# Patient Record
Sex: FEMALE | Race: WHITE | NOT HISPANIC OR LATINO | Employment: FULL TIME | URBAN - METROPOLITAN AREA
[De-identification: names, ages, dates, MRNs, and addresses within clinical notes are randomized per-mention and may not be internally consistent; named-entity substitution may affect disease eponyms.]

---

## 2024-01-08 NOTE — PROGRESS NOTES
"Subjective   Patient ID: Fanta Crow is a 38 y.o. female who presents for the evaluation and further management of metabolic syndrome.  HPI  She complains of the following:  Difficulty losing weight  Triglycerides being elevated   Joint pain  Craving for sugar and carbs  Lack of satiety   Increased swelling - face, hands and feet   Rashes - face, sides of the arms; resolves in 1-2 hours   Vision goes blurry after eating; persists for a few minutes  Increased thirst   Fatigued; increased coffee intake   Denies difficulty sleeping - 7-8 hours per night     She hand success with a keto diet with intermittent fasting.  She was able to lose 25-30 pounds  Regained when she came off keto diet  Tried Whole 30, elimination diets and the 80/20 method     The above symptoms began in 2015  Her weight back then was 130-140 lbs  Gained 20-30 pounds without any changes   Saw OB as menses were being affected.  She was changing tampon every 30 minutes, clots and dysmenorrhea   Shew as prescribed OCP but this caused rage and depression, increased bleeding  Temporary hair loss    She can no longer do treadmill due to knee and ankle pain     She has had Increased ovarian pain and was prescribed Sprintec  - she had problems with her mood, increased breast tenderness and size   She was prescribed a progesterone only pill which she has not yet started.    She denies HTN and DM     Legs become itchy with walking her dogs and or treadmil     Alcohol use - twice per month     Headaches - admits; posterior in location   Acne - cystic in childhood; now to neck     Fungal infections below breast line     Body odor has changed  - smells like onions     Urine smells sweet for the last few years      Review of Systems   Constitutional:  Positive for unexpected weight change.       Objective   Visit Vitals  /66 (BP Location: Right arm, Patient Position: Sitting, BP Cuff Size: Adult)   Pulse 86   Ht 1.549 m (5' 1\")   Wt 103 kg (226 lb " 6.4 oz)   BMI 42.78 kg/m²   Smoking Status Former   BSA 2.11 m²       Physical Exam  Vitals and nursing note reviewed.   Constitutional:       General: She is not in acute distress.     Appearance: Normal appearance. She is obese.      Comments: No buffalo hump    HENT:      Head: Normocephalic and atraumatic.      Nose: Nose normal.      Mouth/Throat:      Mouth: Mucous membranes are moist.   Eyes:      Extraocular Movements: Extraocular movements intact.   Cardiovascular:      Rate and Rhythm: Normal rate and regular rhythm.   Pulmonary:      Effort: Pulmonary effort is normal.      Breath sounds: Normal breath sounds.   Musculoskeletal:         General: Normal range of motion.   Skin:     General: Skin is warm.      Comments: No broad purple striae   Neurological:      Mental Status: She is alert and oriented to person, place, and time.   Psychiatric:         Mood and Affect: Mood normal.       Lab Results   Component Value Date    HGBA1C 5.3 01/11/2024     Lab Results   Component Value Date    GLUCOSE 92 01/11/2024    CALCIUM 9.5 01/11/2024     (L) 01/11/2024    K 4.1 01/11/2024    CO2 25 01/11/2024    CL 99 01/11/2024    BUN 10 01/11/2024    CREATININE 0.83 01/11/2024     Lab Results   Component Value Date    CHOL 182 01/11/2024     Lab Results   Component Value Date    HDL 38.4 01/11/2024     Lab Results   Component Value Date    LDLCALC 91 01/11/2024     Lab Results   Component Value Date    TRIG 262 (H) 01/11/2024       Assessment/Plan   38 year old female presents for the evaluation and further management of metabolic syndrome.    Weight gain  To obtain 8AM blood tests   For 24 hour urinary collection       Screening for thyroid disorder  To obtain TSH with reflex FT4 values     Headache  To obtain pituitary panel     Hypertriglyceridemia  To obtain lipid panel     Joint pain  To obtain LORENZO with reflex DEYA and Vitamin D levels     Fatigue  To obtain iron panel     Screening for diabetes mellitus  To  obtain A1C value     Will follow up with results     Itching  To obtain histamine levels

## 2024-01-09 ENCOUNTER — OFFICE VISIT (OUTPATIENT)
Dept: ENDOCRINOLOGY | Facility: CLINIC | Age: 39
End: 2024-01-09
Payer: COMMERCIAL

## 2024-01-09 VITALS
HEART RATE: 86 BPM | SYSTOLIC BLOOD PRESSURE: 114 MMHG | HEIGHT: 61 IN | WEIGHT: 226.4 LBS | DIASTOLIC BLOOD PRESSURE: 66 MMHG | BODY MASS INDEX: 42.74 KG/M2

## 2024-01-09 DIAGNOSIS — M25.50 ARTHRALGIA, UNSPECIFIED JOINT: ICD-10-CM

## 2024-01-09 DIAGNOSIS — L29.9 ITCHING: ICD-10-CM

## 2024-01-09 DIAGNOSIS — R63.5 WEIGHT GAIN: Primary | ICD-10-CM

## 2024-01-09 DIAGNOSIS — E78.2 ELEVATED CHOLESTEROL WITH ELEVATED TRIGLYCERIDES: ICD-10-CM

## 2024-01-09 DIAGNOSIS — E88.810 METABOLIC SYNDROME: ICD-10-CM

## 2024-01-09 DIAGNOSIS — R53.83 OTHER FATIGUE: ICD-10-CM

## 2024-01-09 PROCEDURE — 99205 OFFICE O/P NEW HI 60 MIN: CPT | Performed by: INTERNAL MEDICINE

## 2024-01-09 PROCEDURE — 1036F TOBACCO NON-USER: CPT | Performed by: INTERNAL MEDICINE

## 2024-01-09 RX ORDER — CETIRIZINE HYDROCHLORIDE 10 MG/1
10 TABLET ORAL
COMMUNITY

## 2024-01-09 RX ORDER — TIZANIDINE 2 MG/1
1-2 TABLET ORAL EVERY 6 HOURS PRN
COMMUNITY
Start: 2022-05-11

## 2024-01-09 RX ORDER — FLUTICASONE PROPIONATE 50 MCG
2 SPRAY, SUSPENSION (ML) NASAL
COMMUNITY
Start: 2023-11-10 | End: 2024-11-09

## 2024-01-09 RX ORDER — VALACYCLOVIR HYDROCHLORIDE 500 MG/1
500 TABLET, FILM COATED ORAL DAILY PRN
COMMUNITY
Start: 2023-02-20

## 2024-01-09 ASSESSMENT — ENCOUNTER SYMPTOMS: UNEXPECTED WEIGHT CHANGE: 1

## 2024-01-09 NOTE — PATIENT INSTRUCTIONS
Thank you for choosing [facility]  for your health care needs.  If you have any questions, concerns or medical needs, please feel free to contact our office at [phone].    Please ensure you complete your blood work one week before the next scheduled appointment.    To obtain 8AM blood tests   For 24 hour urinary collection   Will follow up with results

## 2024-01-10 ENCOUNTER — LAB (OUTPATIENT)
Dept: LAB | Facility: LAB | Age: 39
End: 2024-01-10
Payer: COMMERCIAL

## 2024-01-10 DIAGNOSIS — L29.9 ITCHING: ICD-10-CM

## 2024-01-10 DIAGNOSIS — R63.5 WEIGHT GAIN: ICD-10-CM

## 2024-01-10 DIAGNOSIS — E78.2 ELEVATED CHOLESTEROL WITH ELEVATED TRIGLYCERIDES: ICD-10-CM

## 2024-01-10 DIAGNOSIS — R53.83 OTHER FATIGUE: ICD-10-CM

## 2024-01-10 DIAGNOSIS — M25.50 ARTHRALGIA, UNSPECIFIED JOINT: ICD-10-CM

## 2024-01-10 DIAGNOSIS — E88.810 METABOLIC SYNDROME: ICD-10-CM

## 2024-01-11 ENCOUNTER — LAB (OUTPATIENT)
Dept: LAB | Facility: LAB | Age: 39
End: 2024-01-11
Payer: COMMERCIAL

## 2024-01-11 DIAGNOSIS — L29.9 ITCHING: ICD-10-CM

## 2024-01-11 DIAGNOSIS — R63.5 WEIGHT GAIN: ICD-10-CM

## 2024-01-11 LAB
25(OH)D3 SERPL-MCNC: 26 NG/ML (ref 30–100)
ANION GAP SERPL CALC-SCNC: 14 MMOL/L (ref 10–20)
BUN SERPL-MCNC: 10 MG/DL (ref 6–23)
CALCIUM SERPL-MCNC: 9.5 MG/DL (ref 8.6–10.3)
CHLORIDE SERPL-SCNC: 99 MMOL/L (ref 98–107)
CHOLEST SERPL-MCNC: 182 MG/DL (ref 0–199)
CHOLESTEROL/HDL RATIO: 4.7
CO2 SERPL-SCNC: 25 MMOL/L (ref 21–32)
COLLECT DURATION TIME SPEC: 24 HRS
CORTIS SERPL-MCNC: 14.6 UG/DL (ref 2.5–20)
CREAT 24H UR-MCNC: 54.2 MG/DL (ref 20–320)
CREAT 24H UR-MRATE: 1.63 G/24 H (ref 0.67–1.59)
CREAT SERPL-MCNC: 0.83 MG/DL (ref 0.5–1.05)
DHEA-S SERPL-MCNC: 129 UG/DL (ref 12–379)
EGFRCR SERPLBLD CKD-EPI 2021: >90 ML/MIN/1.73M*2
EST. AVERAGE GLUCOSE BLD GHB EST-MCNC: 105 MG/DL
FSH SERPL-ACNC: 6.3 IU/L
GLUCOSE SERPL-MCNC: 92 MG/DL (ref 74–99)
HBA1C MFR BLD: 5.3 %
HDLC SERPL-MCNC: 38.4 MG/DL
INSULIN P FAST SERPL-ACNC: 14 UIU/ML (ref 3–25)
IRON SATN MFR SERPL: 21 % (ref 25–45)
IRON SERPL-MCNC: 79 UG/DL (ref 35–150)
LDLC SERPL CALC-MCNC: 91 MG/DL
LH SERPL-ACNC: 4.9 IU/L
NON HDL CHOLESTEROL: 144 MG/DL (ref 0–149)
POTASSIUM SERPL-SCNC: 4.1 MMOL/L (ref 3.5–5.3)
SODIUM SERPL-SCNC: 134 MMOL/L (ref 136–145)
SPECIMEN VOL 24H UR: 3000 ML
TIBC SERPL-MCNC: 377 UG/DL (ref 240–445)
TRIGL SERPL-MCNC: 262 MG/DL (ref 0–149)
TSH SERPL-ACNC: 1.4 MIU/L (ref 0.44–3.98)
UIBC SERPL-MCNC: 298 UG/DL (ref 110–370)
VLDL: 52 MG/DL (ref 0–40)

## 2024-01-11 PROCEDURE — 82024 ASSAY OF ACTH: CPT

## 2024-01-11 PROCEDURE — 82530 CORTISOL FREE: CPT

## 2024-01-11 PROCEDURE — 82533 TOTAL CORTISOL: CPT

## 2024-01-11 PROCEDURE — 36415 COLL VENOUS BLD VENIPUNCTURE: CPT

## 2024-01-11 PROCEDURE — 80061 LIPID PANEL: CPT

## 2024-01-11 PROCEDURE — 86038 ANTINUCLEAR ANTIBODIES: CPT

## 2024-01-11 PROCEDURE — 82670 ASSAY OF TOTAL ESTRADIOL: CPT

## 2024-01-11 PROCEDURE — 83550 IRON BINDING TEST: CPT

## 2024-01-11 PROCEDURE — 83036 HEMOGLOBIN GLYCOSYLATED A1C: CPT

## 2024-01-11 PROCEDURE — 83540 ASSAY OF IRON: CPT

## 2024-01-11 PROCEDURE — 82570 ASSAY OF URINE CREATININE: CPT

## 2024-01-11 PROCEDURE — 83001 ASSAY OF GONADOTROPIN (FSH): CPT

## 2024-01-11 PROCEDURE — 82306 VITAMIN D 25 HYDROXY: CPT

## 2024-01-11 PROCEDURE — 81050 URINALYSIS VOLUME MEASURE: CPT

## 2024-01-11 PROCEDURE — 83525 ASSAY OF INSULIN: CPT

## 2024-01-11 PROCEDURE — 83088 ASSAY OF HISTAMINE: CPT

## 2024-01-11 PROCEDURE — 83002 ASSAY OF GONADOTROPIN (LH): CPT

## 2024-01-11 PROCEDURE — 82627 DEHYDROEPIANDROSTERONE: CPT

## 2024-01-11 PROCEDURE — 84305 ASSAY OF SOMATOMEDIN: CPT

## 2024-01-11 PROCEDURE — 80048 BASIC METABOLIC PNL TOTAL CA: CPT

## 2024-01-11 PROCEDURE — 84443 ASSAY THYROID STIM HORMONE: CPT

## 2024-01-11 PROCEDURE — 82681 ASSAY DIR MEAS FR ESTRADIOL: CPT

## 2024-01-12 LAB — ANA SER QL HEP2 SUBST: NEGATIVE

## 2024-01-13 PROBLEM — Z13.29 SCREENING FOR THYROID DISORDER: Status: ACTIVE | Noted: 2024-01-13

## 2024-01-13 PROBLEM — M25.50 JOINT PAIN: Status: ACTIVE | Noted: 2024-01-13

## 2024-01-13 PROBLEM — R53.83 FATIGUE: Status: ACTIVE | Noted: 2024-01-13

## 2024-01-13 PROBLEM — E78.1 HYPERTRIGLYCERIDEMIA: Status: ACTIVE | Noted: 2024-01-13

## 2024-01-13 PROBLEM — Z13.1 SCREENING FOR DIABETES MELLITUS: Status: ACTIVE | Noted: 2024-01-13

## 2024-01-13 PROBLEM — G44.059 HEADACHE , SHORT UNILAT NEURALGIFORM, W/CONJUNCTIVAL INJECTION/TEARING: Status: ACTIVE | Noted: 2024-01-13

## 2024-01-13 PROBLEM — R51.9 HEADACHE: Status: ACTIVE | Noted: 2024-01-13

## 2024-01-13 PROBLEM — R63.5 WEIGHT GAIN: Status: ACTIVE | Noted: 2024-01-13

## 2024-01-13 PROBLEM — L29.9 ITCHING: Status: ACTIVE | Noted: 2024-01-13

## 2024-01-14 LAB
ACTH PLAS-MCNC: 17.5 PG/ML (ref 7.2–63.3)
IGF-I SERPL-MCNC: 163 NG/ML (ref 80–277)
IGF-I Z-SCORE SERPL: 0.1

## 2024-01-15 LAB
ANNOTATION COMMENT IMP: ABNORMAL
COLLECT DURATION TIME SPEC: 24 HR
CORTIS F 24H UR HPLC-MCNC: 19.6 UG/L
CORTIS F 24H UR-MRATE: 58.8 UG/D
CORTIS F/CREAT 24H UR: 35 UG/G CRT
CREAT 24H UR-MRATE: 1680 MG/D (ref 700–1600)
CREAT UR-MCNC: 56 MG/DL
HISTAMINE 24H UR-MRATE: 90 UG/24 HR (ref 0–65)
HISTAMINE UR-MCNC: 30 UG/L
SPECIMEN VOL ?TM UR: 3000 ML

## 2024-01-17 LAB — HISTAMINE SERPL-SCNC: 11 NMOL/L (ref 0–8)

## 2024-01-21 LAB
ESTRADIOL (SJC): 74 PG/ML
ESTRADIOL FREE: 1.12 PG/ML

## 2024-01-31 ENCOUNTER — TELEPHONE (OUTPATIENT)
Dept: ENDOCRINOLOGY | Facility: CLINIC | Age: 39
End: 2024-01-31
Payer: COMMERCIAL

## 2024-01-31 NOTE — TELEPHONE ENCOUNTER
Patient has called multiple times regarding lab results. Please advise. Patient states she would prefer a call back with results.

## 2024-01-31 NOTE — TELEPHONE ENCOUNTER
----- Message from Merline Valladares MA sent at 1/25/2024  4:39 PM EST -----  Regarding: FW: Test Results   Contact: [phone]    ----- Message -----  From: Fanta Crow  Sent: 1/25/2024   3:08 PM EST  To: #  Subject: Test Results                                     Nnamdi King,    I wanted to follow up and see if there is any news on my test results. I wasn’t sure what timeframe to expect from this process.    Thank you,

## 2024-02-14 DIAGNOSIS — R82.998: Primary | ICD-10-CM

## 2024-02-14 DIAGNOSIS — E78.1 HYPERTRIGLYCERIDEMIA: ICD-10-CM

## 2024-02-14 DIAGNOSIS — R82.998 ELEVATED URINARY FREE CORTISOL LEVEL: ICD-10-CM

## 2024-02-14 RX ORDER — DEXAMETHASONE 1 MG/1
TABLET ORAL
Qty: 1 TABLET | Refills: 0 | Status: SHIPPED | OUTPATIENT
Start: 2024-02-14 | End: 2024-06-07 | Stop reason: WASHOUT

## 2024-02-14 RX ORDER — FENOFIBRATE 145 MG/1
145 TABLET, FILM COATED ORAL DAILY
Qty: 90 TABLET | Refills: 3 | Status: SHIPPED | OUTPATIENT
Start: 2024-02-14 | End: 2025-02-13

## 2024-02-14 NOTE — TELEPHONE ENCOUNTER
Patient called to inform Dr. King that she was recently sick. She was on Prednisolone for 5 days and has taken her emergency inhaler. She wants to know if she should proceed with cortisol testing with the recent use of steroids. If not, how long should she wait? She would like to take test tonight and asks for a response as soon as possible. It has been suggested she awaits Dr. King' recommendation before completing test. Please advise.

## 2024-02-14 NOTE — RESULT ENCOUNTER NOTE
Labs have been reviewed.  The histamine values were elevated in the blood and urine.  It will be worthwhile to see an allergist regarding this.  The Vitamin D level is minimally low.  Please start on Vitamin D 1000 units daily (can be obtained over the counter).  The triglyceride value continues to be elevated.  Please start on Fenofibrate 145mg once daily.  There is mild iron deficiency anemia.  Please start on an iron supplement (can be obtained over the counter).  The cortisol value was minimally elevated in the urine.  Please undergo a 1mg Dexamethasone suppression test to assess for possible Cushing's - Take Dexamethasone at 11pm and obtained blood tests at 8AM.

## 2024-02-15 ENCOUNTER — LAB (OUTPATIENT)
Dept: LAB | Facility: LAB | Age: 39
End: 2024-02-15
Payer: COMMERCIAL

## 2024-02-15 ENCOUNTER — TELEPHONE (OUTPATIENT)
Dept: ENDOCRINOLOGY | Facility: CLINIC | Age: 39
End: 2024-02-15

## 2024-02-15 DIAGNOSIS — R82.998 ELEVATED URINARY FREE CORTISOL LEVEL: ICD-10-CM

## 2024-02-15 LAB — CORTIS SERPL-MCNC: 1 UG/DL (ref 2.5–20)

## 2024-02-15 PROCEDURE — 82533 TOTAL CORTISOL: CPT

## 2024-02-15 PROCEDURE — 36415 COLL VENOUS BLD VENIPUNCTURE: CPT

## 2024-02-15 PROCEDURE — 80375 DRUG/SUBSTANCE NOS 1-3: CPT

## 2024-02-15 NOTE — TELEPHONE ENCOUNTER
----- Message from Marc King MD sent at 2/14/2024 12:26 PM EST -----  Labs have been reviewed.  The histamine values were elevated in the blood and urine.  It will be worthwhile to see an allergist regarding this.  The Vitamin D level is minimally low.  Please start on Vitamin D 1000 units daily (can be obtained over the counter).  The triglyceride value continues to be elevated.  Please start on Fenofibrate 145mg once daily.  There is mild iron deficiency anemia.  Please start on an iron supplement (can be obtained over the counter).  The cortisol value was minimally elevated in the urine.  Please undergo a 1mg Dexamethasone suppression test to assess for possible Cushing's - Take Dexamethasone at 11pm and obtained blood tests at 8AM.

## 2024-02-16 NOTE — RESULT ENCOUNTER NOTE
Labs have been reviewed.  The cortisol level has suppressed which is what is looked for after taking Dexamethasone (a normal response is a cortisol less than 1.8). However, this value can be skewed given recent Prednisone use of 5 days.

## 2024-02-20 LAB — DEXAMETHASONE SERPL-MCNC: 469.1 NG/DL

## 2024-02-26 NOTE — RESULT ENCOUNTER NOTE
Labs have been reviewed. There has been adequate suppression of the cortisol following Dexamethasone as a normal response is a value less than 1.8.  There is therefore no evidence of Cushing's.  Please schedule a follow up in ~4 months.

## 2024-05-09 ENCOUNTER — APPOINTMENT (OUTPATIENT)
Dept: ALLERGY | Facility: CLINIC | Age: 39
End: 2024-05-09
Payer: COMMERCIAL

## 2024-06-06 NOTE — PATIENT INSTRUCTIONS
Thank you for choosing [facility]  for your health care needs.  If you have any questions, concerns or medical needs, please feel free to contact our office at [phone].    Please ensure you complete your blood work one week before the next scheduled appointment.    To obtain 8AM blood tests   For 2 salivary value collections  For CT scan of the abdomen  For MRI of the pituitary   Will follow up with results

## 2024-06-06 NOTE — PROGRESS NOTES
Subjective   Fanta Crow is a 38 y.o. female who presents for follow up for metabolic syndrome.    Triglycerides being elevated   Joint pain  Craving for sugar and carbs  Lack of satiety   Increased swelling - face, hands and feet   Rashes - face, sides of the arms; resolves in 1-2 hours   Vision goes blurry after eating; persists for a few minutes  Increased thirst   Fatigued; increased coffee intake   Denies difficulty sleeping - 7-8 hours per night     She hand success with a keto diet with intermittent fasting.  She was able to lose 25-30 pounds  Regained when she came off keto diet  Tried Whole 30, elimination diets and the 80/20 method     The above symptoms began in 2015  Her weight back then was 130-140 lbs  Gained 20-30 pounds without any changes   Saw OB as menses were being affected.  She was changing tampon every 30 minutes, clots and dysmenorrhea   Shew as prescribed OCP but this caused rage and depression, increased bleeding  Temporary hair loss    She can no longer do treadmill due to knee and ankle pain     She denies HTN and DM     Legs become itchy with walking her dogs and or treadmil     Headaches - admits; posterior in location   Acne - cystic in childhood; now to neck   Appetite - decreased can go 8-9 hours without eating    She did see allergist. Testing was negative with the exception of grass and weed.    She has had more rashes.  She started allergra one week  ago    Saw dermaotlogy - diagnsoed with HS    Headaches every few weeks  - posterior, frontal and temporal regions     She was unable to take Iron as it caused a five day migraine    She has brain fog      Review of Systems   Constitutional:  Positive for diaphoresis, fatigue and unexpected weight change (Weight gain).   Cardiovascular:  Positive for palpitations.   Gastrointestinal:  Positive for abdominal pain, diarrhea and nausea.   Musculoskeletal:  Positive for arthralgias and back pain.   Skin:  Positive for rash.  "  Neurological:  Positive for weakness.   Psychiatric/Behavioral:          Memory loss   All other systems reviewed and are negative.      Objective   Visit Vitals  Smoking Status Former     Visit Vitals  BP (!) 158/100 (BP Location: Left arm, Patient Position: Sitting, BP Cuff Size: Adult)   Pulse 90   Ht 1.549 m (5' 1\")   Wt 104 kg (229 lb)   LMP 05/14/2024   BMI 43.27 kg/m²   OB Status Having periods   Smoking Status Former   BSA 2.12 m²       Physical Exam  Vitals and nursing note reviewed.   Constitutional:       General: She is not in acute distress.     Appearance: Normal appearance. She is obese.      Comments: No buffalo hump    HENT:      Head: Normocephalic and atraumatic.      Nose: Nose normal.      Mouth/Throat:      Mouth: Mucous membranes are moist.   Eyes:      Extraocular Movements: Extraocular movements intact.   Cardiovascular:      Rate and Rhythm: Normal rate and regular rhythm.   Pulmonary:      Effort: Pulmonary effort is normal.      Breath sounds: Normal breath sounds.   Musculoskeletal:         General: Normal range of motion.   Skin:     General: Skin is warm.      Comments: No broad purple striae   Neurological:      Mental Status: She is alert and oriented to person, place, and time.   Psychiatric:         Mood and Affect: Mood normal.     Lab Results   Component Value Date    HGBA1C 5.3 01/11/2024     Lab Results   Component Value Date    GLUCOSE 92 01/11/2024    CALCIUM 9.5 01/11/2024     (L) 01/11/2024    K 4.1 01/11/2024    CO2 25 01/11/2024    CL 99 01/11/2024    BUN 10 01/11/2024    CREATININE 0.83 01/11/2024     Lab Results   Component Value Date    CHOL 182 01/11/2024     Lab Results   Component Value Date    HDL 38.4 01/11/2024     Lab Results   Component Value Date    LDLCALC 91 01/11/2024     Lab Results   Component Value Date    TRIG 262 (H) 01/11/2024       Assessment/Plan   38 year old female presents for follow up for metabolic syndrome.    Elevated cortisol " level  For 11pm salivary cortisol swabs x 1   To obtain abdominal CT scan  To obtain pituitary MRI    High triglycerides  To continue Fenofibrate 145mg once daily     Hypertension  To obtain adrenal studies     Flushing  To obtain 5HIAA and chromogranin A    Bloating  To obtain celiac panel     Vitamin D deficiency  To obtain Vitamin D     Aching headache  To obtain pituitary panel

## 2024-06-07 ENCOUNTER — OFFICE VISIT (OUTPATIENT)
Dept: ENDOCRINOLOGY | Facility: CLINIC | Age: 39
End: 2024-06-07
Payer: COMMERCIAL

## 2024-06-07 VITALS
WEIGHT: 229 LBS | HEART RATE: 90 BPM | HEIGHT: 61 IN | SYSTOLIC BLOOD PRESSURE: 158 MMHG | DIASTOLIC BLOOD PRESSURE: 100 MMHG | BODY MASS INDEX: 43.23 KG/M2

## 2024-06-07 DIAGNOSIS — R51.9 ACHING HEADACHE: ICD-10-CM

## 2024-06-07 DIAGNOSIS — R14.0 BLOATING: ICD-10-CM

## 2024-06-07 DIAGNOSIS — E61.1 IRON DEFICIENCY: ICD-10-CM

## 2024-06-07 DIAGNOSIS — E55.9 VITAMIN D DEFICIENCY: ICD-10-CM

## 2024-06-07 DIAGNOSIS — R79.89 ELEVATED CORTISOL LEVEL: ICD-10-CM

## 2024-06-07 DIAGNOSIS — E78.1 HIGH TRIGLYCERIDES: Primary | ICD-10-CM

## 2024-06-07 DIAGNOSIS — R23.2 FLUSHING: ICD-10-CM

## 2024-06-07 DIAGNOSIS — I10 HYPERTENSION, UNSPECIFIED TYPE: ICD-10-CM

## 2024-06-07 PROCEDURE — 3077F SYST BP >= 140 MM HG: CPT | Performed by: INTERNAL MEDICINE

## 2024-06-07 PROCEDURE — 3080F DIAST BP >= 90 MM HG: CPT | Performed by: INTERNAL MEDICINE

## 2024-06-07 PROCEDURE — 99215 OFFICE O/P EST HI 40 MIN: CPT | Performed by: INTERNAL MEDICINE

## 2024-06-07 RX ORDER — MINERAL OIL
180 ENEMA (ML) RECTAL DAILY
COMMUNITY

## 2024-06-07 RX ORDER — IBUPROFEN 200 MG
200 TABLET ORAL EVERY 6 HOURS
COMMUNITY

## 2024-06-07 ASSESSMENT — ENCOUNTER SYMPTOMS
UNEXPECTED WEIGHT CHANGE: 1
ARTHRALGIAS: 1
DIAPHORESIS: 1
NAUSEA: 1
DIARRHEA: 1
ABDOMINAL PAIN: 1
PALPITATIONS: 1
WEAKNESS: 1
FATIGUE: 1
BACK PAIN: 1

## 2024-06-13 PROBLEM — R79.89 ELEVATED CORTISOL LEVEL: Status: ACTIVE | Noted: 2024-06-13

## 2024-06-13 PROBLEM — I10 HYPERTENSION: Status: ACTIVE | Noted: 2024-06-13

## 2024-06-13 PROBLEM — R14.0 BLOATING: Status: ACTIVE | Noted: 2024-06-13

## 2024-06-13 PROBLEM — E61.1 IRON DEFICIENCY: Status: ACTIVE | Noted: 2024-06-13

## 2024-06-13 PROBLEM — R23.2 FLUSHING: Status: ACTIVE | Noted: 2024-06-13

## 2024-06-13 PROBLEM — E55.9 VITAMIN D DEFICIENCY: Status: ACTIVE | Noted: 2024-06-13

## 2024-06-18 ENCOUNTER — TELEPHONE (OUTPATIENT)
Dept: ENDOCRINOLOGY | Facility: CLINIC | Age: 39
End: 2024-06-18
Payer: COMMERCIAL

## 2024-06-18 NOTE — TELEPHONE ENCOUNTER
Received call from patient's insurance.  Upon review of medical documentation, coverage for MRI is denied.  Per representative, provider may call [phone] to initiate a peer to peer.  A faxed copy of the denial will be sent to provider and mailed to the patient.

## 2024-06-19 ENCOUNTER — LAB (OUTPATIENT)
Dept: LAB | Facility: LAB | Age: 39
End: 2024-06-19
Payer: COMMERCIAL

## 2024-06-19 DIAGNOSIS — R51.9 ACHING HEADACHE: ICD-10-CM

## 2024-06-19 DIAGNOSIS — E55.9 VITAMIN D DEFICIENCY: ICD-10-CM

## 2024-06-19 DIAGNOSIS — I10 HYPERTENSION, UNSPECIFIED TYPE: ICD-10-CM

## 2024-06-19 DIAGNOSIS — E61.1 IRON DEFICIENCY: ICD-10-CM

## 2024-06-19 DIAGNOSIS — R23.2 FLUSHING: ICD-10-CM

## 2024-06-19 DIAGNOSIS — R14.0 BLOATING: ICD-10-CM

## 2024-06-19 DIAGNOSIS — E78.1 HIGH TRIGLYCERIDES: ICD-10-CM

## 2024-06-19 LAB
25(OH)D3 SERPL-MCNC: 27 NG/ML (ref 30–100)
ANION GAP SERPL CALC-SCNC: 13 MMOL/L (ref 10–20)
BUN SERPL-MCNC: 9 MG/DL (ref 6–23)
CALCIUM SERPL-MCNC: 9.3 MG/DL (ref 8.6–10.3)
CHLORIDE SERPL-SCNC: 103 MMOL/L (ref 98–107)
CHOLEST SERPL-MCNC: 165 MG/DL (ref 0–199)
CHOLESTEROL/HDL RATIO: 3.7
CO2 SERPL-SCNC: 27 MMOL/L (ref 21–32)
CREAT SERPL-MCNC: 0.83 MG/DL (ref 0.5–1.05)
EGFRCR SERPLBLD CKD-EPI 2021: >90 ML/MIN/1.73M*2
GLUCOSE SERPL-MCNC: 94 MG/DL (ref 74–99)
HDLC SERPL-MCNC: 45.2 MG/DL
IRON SATN MFR SERPL: 24 % (ref 25–45)
IRON SERPL-MCNC: 88 UG/DL (ref 35–150)
LDLC SERPL CALC-MCNC: 98 MG/DL
NON HDL CHOLESTEROL: 120 MG/DL (ref 0–149)
POTASSIUM SERPL-SCNC: 4.7 MMOL/L (ref 3.5–5.3)
SODIUM SERPL-SCNC: 138 MMOL/L (ref 136–145)
TIBC SERPL-MCNC: 362 UG/DL (ref 240–445)
TRIGL SERPL-MCNC: 109 MG/DL (ref 0–149)
UIBC SERPL-MCNC: 274 UG/DL (ref 110–370)
VLDL: 22 MG/DL (ref 0–40)

## 2024-06-19 PROCEDURE — 83002 ASSAY OF GONADOTROPIN (LH): CPT

## 2024-06-19 PROCEDURE — 83001 ASSAY OF GONADOTROPIN (FSH): CPT

## 2024-06-19 PROCEDURE — 82533 TOTAL CORTISOL: CPT

## 2024-06-19 PROCEDURE — 82670 ASSAY OF TOTAL ESTRADIOL: CPT

## 2024-06-19 PROCEDURE — 84305 ASSAY OF SOMATOMEDIN: CPT

## 2024-06-19 PROCEDURE — 83516 IMMUNOASSAY NONANTIBODY: CPT

## 2024-06-19 PROCEDURE — 82627 DEHYDROEPIANDROSTERONE: CPT

## 2024-06-19 PROCEDURE — 82306 VITAMIN D 25 HYDROXY: CPT

## 2024-06-19 PROCEDURE — 84146 ASSAY OF PROLACTIN: CPT

## 2024-06-19 NOTE — TELEPHONE ENCOUNTER
Received call from [facility] department.  CT of abdomen is approved from 6/17/24 through 7/16/24.  Approval ID# [id number]

## 2024-06-20 LAB
CORTIS SERPL-MCNC: 29 UG/DL (ref 2.5–20)
DHEA-S SERPL-MCNC: 153 UG/DL (ref 12–379)
ESTRADIOL SERPL-MCNC: 153 PG/ML
FSH SERPL-ACNC: 4.5 IU/L
GLIADIN PEPTIDE IGA SER IA-ACNC: <1 U/ML
LH SERPL-ACNC: 5.2 IU/L
PROLACTIN SERPL-MCNC: 10.8 UG/L (ref 3–20)
TTG IGA SER IA-ACNC: <1 U/ML

## 2024-06-21 ENCOUNTER — HOSPITAL ENCOUNTER (OUTPATIENT)
Dept: RADIOLOGY | Facility: CLINIC | Age: 39
Discharge: HOME | End: 2024-06-21
Payer: COMMERCIAL

## 2024-06-21 DIAGNOSIS — I10 HYPERTENSION, UNSPECIFIED TYPE: ICD-10-CM

## 2024-06-21 LAB
GLIADIN PEPTIDE IGG SER IA-ACNC: <0.56 FLU (ref 0–4.99)
TTG IGG SER IA-ACNC: <0.82 FLU (ref 0–4.99)

## 2024-06-21 PROCEDURE — 74150 CT ABDOMEN W/O CONTRAST: CPT

## 2024-06-22 LAB
ACTH PLAS-MCNC: 58.5 PG/ML (ref 7.2–63.3)
ALDOST SERPL-MCNC: 17.2 NG/DL

## 2024-06-23 LAB
CORTIS SAL-MCNC: 0.09 UG/DL
CORTIS SAL-MCNC: 0.11 UG/DL
IGF-I SERPL-MCNC: 175 NG/ML (ref 80–277)
IGF-I Z-SCORE SERPL: 0.3
RENIN PLAS-CCNC: 1.2 NG/ML/HR

## 2024-06-24 LAB
ANNOTATION COMMENT IMP: NORMAL
METANEPHS SERPL-SCNC: 0.15 NMOL/L (ref 0–0.49)
NORMETANEPH FREE SERPL-SCNC: 0.32 NMOL/L (ref 0–0.89)
TESTOSTERONE FREE (CHAN): NORMAL
TESTOSTERONE,TOTAL,LC-MS/MS: 33 NG/DL (ref 2–45)

## 2024-06-25 NOTE — TELEPHONE ENCOUNTER
I spoke with Fanta and she states that she spoke with her insurance to obtain the information on a facility that she can have her MRI at.  She provided me with the information.  Facility- [facility]  Address-[address].  [place], [place] [address]  Mmama-[phone]  Fax- [phone].  Patient states that she will need the MRI order and the MRI approval faxed to [facility].  She states that the approval needs to have the site changed from  to [facility].  I called and spoke with Tracy from our pre-cert department and she kindly offered to make that change for me.  She will email the new authorization to me and I will fax the order along with it once I receive it.  No change in MRI order is necessary at this time.

## 2024-06-25 NOTE — TELEPHONE ENCOUNTER
Patient called today, stating she will need a new req for the MRI, because insurance will not approve the MRI to be completed at a hospital facility, insurance state patient is not allergic to the dye therefore MRI  can be done at Radiology center. (Please send new req for MRI)

## 2024-06-26 ENCOUNTER — APPOINTMENT (OUTPATIENT)
Dept: RADIOLOGY | Facility: CLINIC | Age: 39
End: 2024-06-26
Payer: COMMERCIAL

## 2024-06-26 LAB
DOPAMINE SERPL-MCNC: <30 PG/ML (ref 0–48)
EPINEPH PLAS-MCNC: 19 PG/ML (ref 0–62)
NOREPINEPH PLAS-MCNC: 274 PG/ML (ref 0–874)

## 2024-06-27 DIAGNOSIS — E26.9 ALDOSTERONISM (MULTI): ICD-10-CM

## 2024-06-27 DIAGNOSIS — I10 HYPERTENSION, UNSPECIFIED TYPE: Primary | ICD-10-CM

## 2024-06-27 RX ORDER — SPIRONOLACTONE 25 MG/1
25 TABLET ORAL DAILY
Qty: 30 TABLET | Refills: 5 | Status: SHIPPED | OUTPATIENT
Start: 2024-06-27 | End: 2024-12-24

## 2024-06-28 LAB
5OH-INDOLEACETATE SERPL-MCNC: 8.6 NG/ML
CHROMOGRANIN A, LC/MS/MS: 81 NG/ML

## 2024-07-19 ENCOUNTER — TELEPHONE (OUTPATIENT)
Dept: ENDOCRINOLOGY | Facility: CLINIC | Age: 39
End: 2024-07-19

## 2024-07-19 DIAGNOSIS — E26.9 ALDOSTERONISM (MULTI): ICD-10-CM

## 2024-07-19 DIAGNOSIS — I10 HYPERTENSION, UNSPECIFIED TYPE: ICD-10-CM

## 2024-07-19 NOTE — TELEPHONE ENCOUNTER
Patient called to follow up on mri results; scanned 7/1/24.    She also c/o shortness of breath, and high blood pressure. She was seen at ED recently and 3 small mass were found on pancreas. Please advise.

## 2024-07-23 RX ORDER — SPIRONOLACTONE 50 MG/1
50 TABLET, FILM COATED ORAL DAILY
Qty: 30 TABLET | Refills: 5 | Status: SHIPPED | OUTPATIENT
Start: 2024-07-23 | End: 2025-01-19

## 2024-07-23 NOTE — TELEPHONE ENCOUNTER
MRI of the head was within normal limits.  The CT scan performed on 6/21 did not reveal any pancreatic lesions.      Please increase Spironolactone to 50mg once daily

## 2024-07-24 NOTE — TELEPHONE ENCOUNTER
Patient states other things showed up on her Ct scan on 6/21 on her liver and colon she wants to know if a referral is needed. She also states that when seen in ED lesions were found since 6/21/24. Patient has been provided fax number for medical records.    Patient would also like to discuss nodule on adrenal gland. She states that after 40 mins of work out she had an episode and would medication change.

## 2024-07-24 NOTE — TELEPHONE ENCOUNTER
Patient's mother called demanding a reason for the delay in results and that Dr. King call Fanta. It was explained that we are unable to discuss with mom and she stated she was looking for any information regarding results. Fanta had been contacted and expressed understanding of results but had additional questions; please see below.      Mom wanted to note that since MRI and CT scan ordered by Fernando Guilleni was seen at a local ED where another CT, MRI and ultrasound was preformed showing lesions. Awaiting fax of those results.

## 2024-08-16 ENCOUNTER — APPOINTMENT (OUTPATIENT)
Dept: ENDOCRINOLOGY | Facility: CLINIC | Age: 39
End: 2024-08-16
Payer: COMMERCIAL